# Patient Record
Sex: FEMALE | Race: WHITE | NOT HISPANIC OR LATINO
[De-identification: names, ages, dates, MRNs, and addresses within clinical notes are randomized per-mention and may not be internally consistent; named-entity substitution may affect disease eponyms.]

---

## 2023-11-08 ENCOUNTER — TRANSCRIBE ORDERS (OUTPATIENT)
Dept: OTHER | Age: 68
End: 2023-11-08

## 2023-11-08 ENCOUNTER — PRE VISIT (OUTPATIENT)
Dept: ONCOLOGY | Facility: CLINIC | Age: 68
End: 2023-11-08
Payer: MEDICARE

## 2023-11-08 DIAGNOSIS — R87.619 ABNORMAL PAP SMEAR OF CERVIX: Primary | ICD-10-CM

## 2023-11-08 NOTE — TELEPHONE ENCOUNTER
RECORDS STATUS - ALL OTHER DIAGNOSIS      RECORDS RECEIVED FROM: [facility]/ [facility]    Appt Date: Warm Transfer From Scheduling     Action    Action Taken 11/8/2023 3:15pm ANGE     I spoke to Bre - all of her outside records are at [facility].     I called 's IMG dept Ph: [phone]- they want me to fax over a request. I sent them a formal fax.       NOTES STATUS DETAILS   OFFICE NOTE from referring provider Complete  Dr Celestina Alvarado at [facility]- hx of hysterectomy in 2019 at [facility] with Dr Alvarado   OPERATIVE REPORT Complete- CE ()  10/13/2023 Pap   (ASC-H) Atypical squamous cells of undetermined significance, cannot exclude HSIL. Abnormal      3/2/2021 Pap   Negative for intraepithelial lesion or malignancy (NILM).     12/18/2019 Pap   Atypical squamous cells of undetermined significance, cannot exclude high grade (ASC-H). Abnormal     CLINICAL TRIAL TREATMENTS TO DATE     LABS     PATHOLOGY REPORTS  1/22/2020   A.  Vagina, right vaginal apex, biopsy:  Atrophic squamous mucosa     B.  Vagina, left vaginal apex, biopsy:  Atrophic squamous mucosa    1/23/2019   Uterus, cervix bilateral fallopian tubes and ovaries, hysterectomy and      BSO:    -No squamous epithelial dysplasia or malignancy identified, see      comment.    -Atrophic endometrium.    -No significant pathologic changes of the myometrium, serosa,      bilateral fallopian tubes or bilateral ovaries.     1/22/2019   Cervix, cone biopsy:       - Endocervical fragments with biopsy site changes; no evidence of         dysplasia or malignancy    ANYTHING RELATED TO DIAGNOSIS     GENONOMIC TESTING     TYPE:     IMAGING (NEED IMAGES & REPORT)     CT SCANS     MRI     MAMMO     ULTRASOUND Requested-   US Pelvic Complete 2/13/2018    PET

## 2023-11-09 ENCOUNTER — PATIENT OUTREACH (OUTPATIENT)
Dept: ONCOLOGY | Facility: CLINIC | Age: 68
End: 2023-11-09
Payer: MEDICARE

## 2023-11-09 NOTE — PROGRESS NOTES
New Patient Hematology / Oncology Nurse Navigator Note     Referral Date: 11/8/23    Referring provider: Self-referred, transfer of care from Dr. Alvarado at     Referring Clinic/Organization: [facility] / [facility]   Self Referred     Referred to: GynOnc    Requested provider (if applicable): Dr. Alvarado    Evaluation for : ASCUS with positive high risk HPV cervical       Clinical History (per Nurse review of records provided):     1/30/19 Office Visit with Dr. Alvarado at -- BOOKMARKED  10/13/23 PAP/HPV:   Pap Interpretation (ASC-H) Atypical squamous cells of undetermined significance, cannot exclude HSIL. Abnormal      Component  Ref Range & Units 3 wk ago   HPV High Risk Type 16 PCR  Not detected Detected Abnormal    HPV High Risk Type 18 PCR  Not Detected Not Detected   HPV High Risk Other Than 16/18  Not detected Not Detected   [facility]   1/23/19 Path:  DIAGNOSIS:    Uterus, cervix bilateral fallopian tubes and ovaries, hysterectomy and      BSO:    -No squamous epithelial dysplasia or malignancy identified, see      comment.    -Atrophic endometrium.    -No significant pathologic changes of the myometrium, serosa,      bilateral fallopian tubes or bilateral ovaries.  -- BOOKMARKED      Clinical Assessment / Barriers to Care (Per Nurse):  Pt lives in [place], [place]    Records Location: Care Everywhere     Records Needed:   Records from  per protocol (do NOT need to request path)    Additional testing needed prior to consult:   N/A    Referral updates and Plan:   OUTGOING CALL to pt:  Introduced my role as nurse navigator with [facility] Hematology/Oncology dept and that we have recd the referral to GynOnc from .   Pt confirms they are aware of the referral and ready to schedule. Explained the options of scheduling with Dr. Alvarado who she saw in 2019 at  or Dr. Buchanan in her [facility].     Patient would like to think about next steps and will call back to  schedule, likely with Dr. Buchanan's [facility].     Provided my direct call back number when she's ready to schedule.       Hiral Balderas, BSN, RN, PHN, OCN  Hematology/Oncology Nurse Navigator  [facility]  [phone]

## 2023-11-16 NOTE — PROGRESS NOTES
Attempted to reach patient for update on her plan as she has not called back as discussed. Per chart review, referral has also been sent to [facility] and they are working to obtain her records.     Left VM asking pt to call back if/when she would like to schedule. Will route to NPS to continue reaching out per protocol. Will follow-up as needed.

## 2023-11-16 NOTE — PROGRESS NOTES
Pt returned call. Left VM stating she has scheduled with [facility] and is not interested in scheduling with Dr. Alvarado/Dewayne at this time. She has our contact number and will call if any future scheduling needs arise. Requests we cancel her referral at this time.